# Patient Record
Sex: FEMALE | Race: WHITE | NOT HISPANIC OR LATINO | Employment: UNEMPLOYED | URBAN - METROPOLITAN AREA
[De-identification: names, ages, dates, MRNs, and addresses within clinical notes are randomized per-mention and may not be internally consistent; named-entity substitution may affect disease eponyms.]

---

## 2023-11-27 ENCOUNTER — HOSPITAL ENCOUNTER (OUTPATIENT)
Dept: CARDIOLOGY | Facility: HOSPITAL | Age: 58
Discharge: HOME | End: 2023-11-27
Payer: MEDICARE

## 2023-11-27 DIAGNOSIS — Z00.6 ENCOUNTER FOR EXAMINATION FOR NORMAL COMPARISON AND CONTROL IN CLINICAL RESEARCH PROGRAM: ICD-10-CM

## 2023-11-28 ENCOUNTER — OFFICE VISIT (OUTPATIENT)
Dept: CARDIAC SURGERY | Facility: HOSPITAL | Age: 58
End: 2023-11-28
Payer: MEDICARE

## 2023-11-28 VITALS
HEART RATE: 104 BPM | OXYGEN SATURATION: 99 % | DIASTOLIC BLOOD PRESSURE: 70 MMHG | TEMPERATURE: 97.3 F | SYSTOLIC BLOOD PRESSURE: 90 MMHG | WEIGHT: 172 LBS | BODY MASS INDEX: 28.66 KG/M2 | HEIGHT: 65 IN

## 2023-11-28 DIAGNOSIS — I73.9 PERIPHERAL ARTERIAL DISEASE WITH HISTORY OF REVASCULARIZATION: ICD-10-CM

## 2023-11-28 DIAGNOSIS — I25.10 CORONARY ARTERY DISEASE INVOLVING NATIVE CORONARY ARTERY OF NATIVE HEART WITHOUT ANGINA PECTORIS: ICD-10-CM

## 2023-11-28 DIAGNOSIS — E11.42 DIABETIC POLYNEUROPATHY ASSOCIATED WITH TYPE 2 DIABETES MELLITUS: ICD-10-CM

## 2023-11-28 DIAGNOSIS — Z71.6 TOBACCO ABUSE COUNSELING: ICD-10-CM

## 2023-11-28 DIAGNOSIS — E11.69 DIABETIC FOOT ULCER WITH OSTEOMYELITIS: ICD-10-CM

## 2023-11-28 DIAGNOSIS — I96 ISCHEMIC GANGRENE: Primary | ICD-10-CM

## 2023-11-28 DIAGNOSIS — Z98.890 PERIPHERAL ARTERIAL DISEASE WITH HISTORY OF REVASCULARIZATION: ICD-10-CM

## 2023-11-28 DIAGNOSIS — L97.509 DIABETIC FOOT ULCER WITH OSTEOMYELITIS: ICD-10-CM

## 2023-11-28 DIAGNOSIS — J43.9 PULMONARY EMPHYSEMA, UNSPECIFIED EMPHYSEMA TYPE: ICD-10-CM

## 2023-11-28 DIAGNOSIS — T82.855A STENOSIS OF CORONARY ARTERY STENT, INITIAL ENCOUNTER: ICD-10-CM

## 2023-11-28 DIAGNOSIS — Z72.0 TOBACCO ABUSE: ICD-10-CM

## 2023-11-28 DIAGNOSIS — E11.59 TYPE 2 DIABETES MELLITUS WITH VASCULAR DISEASE: ICD-10-CM

## 2023-11-28 DIAGNOSIS — M86.9 DIABETIC FOOT ULCER WITH OSTEOMYELITIS: ICD-10-CM

## 2023-11-28 DIAGNOSIS — E11.621 DIABETIC FOOT ULCER WITH OSTEOMYELITIS: ICD-10-CM

## 2023-11-28 PROCEDURE — 99204 OFFICE O/P NEW MOD 45 MIN: CPT | Performed by: STUDENT IN AN ORGANIZED HEALTH CARE EDUCATION/TRAINING PROGRAM

## 2023-11-28 RX ORDER — HUMAN INSULIN 100 [IU]/ML
INJECTION, SUSPENSION SUBCUTANEOUS
COMMUNITY
Start: 2023-11-24

## 2023-11-28 RX ORDER — TICAGRELOR 90 MG/1
90 TABLET ORAL 2 TIMES DAILY
COMMUNITY
Start: 2023-11-03

## 2023-11-28 RX ORDER — SEMAGLUTIDE 0.68 MG/ML
INJECTION, SOLUTION SUBCUTANEOUS WEEKLY
COMMUNITY

## 2023-11-28 RX ORDER — OMEPRAZOLE 40 MG/1
40 CAPSULE, DELAYED RELEASE ORAL DAILY
COMMUNITY
Start: 2023-11-25

## 2023-11-28 RX ORDER — IPRATROPIUM BROMIDE AND ALBUTEROL SULFATE 2.5; .5 MG/3ML; MG/3ML
3 SOLUTION RESPIRATORY (INHALATION) EVERY 4 HOURS PRN
COMMUNITY

## 2023-11-28 RX ORDER — ONDANSETRON 4 MG/1
4 TABLET, FILM COATED ORAL EVERY 8 HOURS PRN
COMMUNITY

## 2023-11-28 RX ORDER — METOPROLOL SUCCINATE 100 MG/1
100 TABLET, EXTENDED RELEASE ORAL DAILY
COMMUNITY
Start: 2023-11-07

## 2023-11-28 RX ORDER — ATORVASTATIN CALCIUM 80 MG/1
80 TABLET, FILM COATED ORAL DAILY
COMMUNITY
Start: 2023-09-01

## 2023-11-28 RX ORDER — INSULIN GLARGINE 100 [IU]/ML
INJECTION, SOLUTION SUBCUTANEOUS
COMMUNITY
Start: 2023-11-03

## 2023-11-28 RX ORDER — CILOSTAZOL 100 MG/1
100 TABLET ORAL 2 TIMES DAILY
COMMUNITY
Start: 2023-11-01

## 2023-11-28 RX ORDER — NICOTINE 21 MG/24HR
1 PATCH, TRANSDERMAL 24 HOURS TRANSDERMAL AS NEEDED
COMMUNITY

## 2023-11-28 RX ORDER — RIVAROXABAN 2.5 MG/1
2.5 TABLET, FILM COATED ORAL 2 TIMES DAILY
COMMUNITY
Start: 2023-10-12

## 2023-11-28 RX ORDER — LOSARTAN POTASSIUM 100 MG/1
1 TABLET ORAL DAILY
COMMUNITY
Start: 2023-10-30

## 2023-11-28 RX ORDER — ALBUTEROL SULFATE 90 UG/1
2 AEROSOL, METERED RESPIRATORY (INHALATION) EVERY 4 HOURS PRN
COMMUNITY

## 2023-11-28 RX ORDER — GABAPENTIN 800 MG/1
800 TABLET ORAL 4 TIMES DAILY
COMMUNITY
Start: 2023-11-02

## 2023-11-28 RX ORDER — DAPAGLIFLOZIN 10 MG/1
1 TABLET, FILM COATED ORAL DAILY
COMMUNITY
Start: 2023-09-08

## 2023-11-28 RX ORDER — NITROGLYCERIN 0.4 MG/1
0.4 TABLET SUBLINGUAL
COMMUNITY

## 2023-11-28 NOTE — PROGRESS NOTES
11/28/2023  Patient Information  [address] [address]   1965  'PCP/Referring Physician'  Evert Kraft MD  [phone]  No ref. provider found    Chief Complaint   Patient presents with    Consult     NP per Dr. Garces for Right Foot       History of Present Illness: 58-year-old woman with history of tobacco abuse, diabetes, neuropathy, coronary artery disease with multiple coronary artery stents, and COPD who presents to [facility] as referral from Dr. Jung Garces primary care physician for evaluation and management of right foot gangrene.  The patient has a history of peripheral arterial disease and in the distant past underwent right SFA stent placement complicated by occlusion.  The patient developed gangrene at the right heel and lateral forefoot approximately 1 year ago, and subsequently underwent multiple episodes of hyperbaric oxygen therapy as well as repeat endovascular intervention by Dr. Jara in September 2023 with laser atherectomy and drug-coated balloon angioplasty of the right femoral-popliteal system.  She reports worsening gangrene at the foot over the past several days/weeks, and is accompanied by her daughter today.      There is no problem list on file for this patient.    Past Medical History:   Diagnosis Date    Arthritis     COPD (chronic obstructive pulmonary disease)     Coronary artery disease     Depression     Diabetes mellitus     GERD (gastroesophageal reflux disease)     Hyperlipidemia     Hypertension     Myocardial infarction     Peripheral vascular disease     Rheumatic fever      Past Surgical History:   Procedure Laterality Date    CARPAL TUNNEL RELEASE      CORONARY STENT PLACEMENT      FOOT SURGERY Left     Tumor on the arch    HYSTERECTOMY      SHOULDER SURGERY Left     x 2    TONSILLECTOMY         Current Outpatient  Medications:     Acetaminophen (ARTHRITIS PAIN RELIEF PO), Take  by mouth As Needed., Disp: , Rfl:     albuterol (2.5 MG/3ML) 0.083% nebulizer solution 3 mL, albuterol (5 MG/ML) 0.5% nebulizer solution 0.5 mL, Inhale As Needed., Disp: , Rfl:     albuterol sulfate  (90 Base) MCG/ACT inhaler, Inhale 2 puffs Every 4 (Four) Hours As Needed for Wheezing., Disp: , Rfl:     atorvastatin (LIPITOR) 80 MG tablet, Take 1 tablet by mouth Daily., Disp: , Rfl:     Brilinta 90 MG tablet tablet, 1 tablet 2 (Two) Times a Day., Disp: , Rfl:     cilostazol (PLETAL) 100 MG tablet, Take 1 tablet by mouth 2 (Two) Times a Day. 1/2 tablet BID, Disp: , Rfl:     Diclofenac Sodium (VOLTAREN) 1 % gel gel, Apply 4 g topically to the appropriate area as directed As Needed., Disp: , Rfl:     diphenhydrAMINE 12.5 MG/5ML elixir 20 mL, aluminum-magnesium hydroxide-simethicone 400-400-40 MG/5ML suspension 20 mL, Lidocaine Viscous HCl 2 % solution 20 mL, Swish and spit Every 4 (Four) Hours As Needed., Disp: , Rfl:     Farxiga 10 MG tablet, Take 10 mg by mouth Daily., Disp: , Rfl:     gabapentin (NEURONTIN) 800 MG tablet, Take 1 tablet by mouth 4 (Four) Times a Day., Disp: , Rfl:     Insulin Glargine (BASAGLAR KWIKPEN) 100 UNIT/ML injection pen, Inject  under the skin into the appropriate area as directed., Disp: , Rfl:     ipratropium-albuterol (DUO-NEB) 0.5-2.5 mg/3 ml nebulizer, Take 3 mL by nebulization Every 4 (Four) Hours As Needed for Wheezing., Disp: , Rfl:     losartan (COZAAR) 100 MG tablet, Take 1 tablet by mouth Daily., Disp: , Rfl:     metoprolol succinate XL (TOPROL-XL) 100 MG 24 hr tablet, Take 1 tablet by mouth Daily., Disp: , Rfl:     nicotine (NICODERM CQ) 14 MG/24HR patch, Place 1 patch on the skin as directed by provider As Needed., Disp: , Rfl:     nitroglycerin (NITROSTAT) 0.4 MG SL tablet, Place 1 tablet under the tongue Every 5 (Five) Minutes As Needed for Chest Pain. Take no more than 3 doses in 15 minutes., Disp: , Rfl:      NON FORMULARY, IV Antibiotics via PICC Line, Disp: , Rfl:     NovoLIN 70/30 FlexPen (70-30) 100 UNIT/ML suspension pen-injector, , Disp: , Rfl:     omeprazole (priLOSEC) 40 MG capsule, Take 1 capsule by mouth Daily., Disp: , Rfl:     ondansetron (ZOFRAN) 4 MG tablet, Take 1 tablet by mouth Every 8 (Eight) Hours As Needed for Nausea or Vomiting., Disp: , Rfl:     polyethyl glycol-propyl glycol (SYSTANE) 0.4-0.3 % solution ophthalmic solution (artificial tears), Every 1 (One) Hour As Needed., Disp: , Rfl:     Semaglutide,0.25 or 0.5MG/DOS, (Ozempic, 0.25 or 0.5 MG/DOSE,) 2 MG/3ML solution pen-injector, Inject  under the skin into the appropriate area as directed 1 (One) Time Per Week., Disp: , Rfl:     Xarelto 2.5 MG tablet, Take 1 tablet by mouth 2 (Two) Times a Day., Disp: , Rfl:   Allergies   Allergen Reactions    Aspirin Nausea And Vomiting     Full dose aspirin, is able to tolerate 81 mg     Social History     Socioeconomic History    Marital status:     Number of children: 2   Tobacco Use    Smoking status: Every Day     Packs/day: 1.00     Years: 45.00     Additional pack years: 0.00     Total pack years: 45.00     Types: Cigarettes    Smokeless tobacco: Never   Vaping Use    Vaping Use: Some days   Substance and Sexual Activity    Alcohol use: Not Currently    Drug use: Not Currently     Family History   Problem Relation Age of Onset    Heart attack Mother     Cancer Mother      Review of Systems   Constitutional: Positive for chills and malaise/fatigue. Negative for fever, night sweats and weight loss.   HENT:  Negative for hearing loss, odynophagia and sore throat.    Cardiovascular:  Positive for claudication (right leg) and leg swelling. Negative for chest pain, dyspnea on exertion, orthopnea and palpitations.   Respiratory:  Negative for cough and hemoptysis.    Endocrine: Negative for cold intolerance, heat intolerance, polydipsia, polyphagia and polyuria.   Hematologic/Lymphatic:  "Bruises/bleeds easily.   Skin:  Positive for poor wound healing. Negative for itching and rash.   Musculoskeletal:  Positive for arthritis and joint pain. Negative for joint swelling and myalgias.   Gastrointestinal:  Positive for diarrhea, nausea and vomiting. Negative for abdominal pain, constipation, hematemesis, hematochezia and melena.   Genitourinary:  Negative for dysuria, frequency and hematuria.   Neurological:  Positive for dizziness and light-headedness. Negative for focal weakness, headaches, numbness and seizures.   Psychiatric/Behavioral:  Positive for depression. Negative for suicidal ideas.    All other systems reviewed and are negative.  Vitals:    11/28/23 0851   BP: 90/70   BP Location: Left arm   Patient Position: Sitting   Pulse: 104   Temp: 97.3 °F (36.3 °C)   SpO2: 99%   Weight: 78 kg (172 lb)   Height: 165.1 cm (65\")      Physical Exam  General no acute distress, pleasant, interactive  Head normocephalic, atraumatic  Eyes clear sclerae  ENT no discharge, neck supple  Mouth mucous membranes moist  Cardiac regular rate rhythm, no murmurs rubs gallops  Vascular warm well perfused x4 extremities, nonpalpable pedal pulses bilateral  Pulmonary lungs clear to auscultation bilaterally  Abdomen soft nontender nondistended  Lymphatic no edema bilateral lower extremities  Neurological grossly intact  Psychological appropriate  Dermatological there is full-thickness desquamation with gangrenous changes across the entire plantar heel and right lateral distal forefoot with exposed bone and tenderness; there is a healed pressure ulcer at the lateral aspect of the left fifth metatarsal-phalangeal joint  Musculoskeletal normal tone and bulk    The ROS, past medical history, surgical history, family history, social history, and vitals were reviewed by myself and corrected as needed.      Assessment/Plan:  58-year-old woman with history of tobacco abuse, diabetes, neuropathy, coronary artery disease with " multiple coronary artery stents, and COPD who presents to [facility] as referral from Dr. Jung Garces primary care physician for evaluation and management of right foot gangrene.  The patient has a history of peripheral arterial disease and in the distant past underwent right SFA stent placement complicated by occlusion.  The patient developed gangrene at the right heel and lateral forefoot approximately 1 year ago, and subsequently underwent multiple episodes of hyperbaric oxygen therapy as well as repeat endovascular intervention by Dr. Jara in September 2023 with laser atherectomy and drug-coated balloon angioplasty of the right femoral-popliteal system.  She reports worsening gangrene at the foot over the past several days/weeks, and likely has an unreconstructable right heel and lateral forefoot due to advanced ischemic gangrene and osteomyelitis.  I discussed my concerns with her primary care physician Dr. Garces and also with orthopedic foot and ankle surgeon Dr. Robbie Bray.  I suggest right below the knee amputation. I am concerned about the left foot, and I suggest offloading with protective shoes during the healing process of the right leg.  I would like to see the patient back in the clinic in 3-6 months to review her progress and prioritize the left foot.    I would like to thank you for the opportunity to participate in the care of this very pleasant patient.     Giancarlo Alonzo M.D., R.P.V.I.  Cardiothoracic and Vascular Surgeon  [facility]      There is no problem list on file for this patient.

## 2023-11-29 ENCOUNTER — TELEPHONE (OUTPATIENT)
Dept: CARDIAC SURGERY | Facility: CLINIC | Age: 58
End: 2023-11-29
Payer: MEDICARE